# Patient Record
Sex: MALE | Race: OTHER | HISPANIC OR LATINO | Employment: FULL TIME | URBAN - METROPOLITAN AREA
[De-identification: names, ages, dates, MRNs, and addresses within clinical notes are randomized per-mention and may not be internally consistent; named-entity substitution may affect disease eponyms.]

---

## 2022-01-20 ENCOUNTER — OFFICE VISIT (OUTPATIENT)
Dept: URGENT CARE | Facility: CLINIC | Age: 37
End: 2022-01-20

## 2022-01-20 VITALS
HEIGHT: 67 IN | RESPIRATION RATE: 16 BRPM | TEMPERATURE: 97 F | SYSTOLIC BLOOD PRESSURE: 115 MMHG | WEIGHT: 245 LBS | HEART RATE: 62 BPM | OXYGEN SATURATION: 98 % | BODY MASS INDEX: 38.45 KG/M2 | DIASTOLIC BLOOD PRESSURE: 65 MMHG

## 2022-01-20 DIAGNOSIS — R42 DIZZINESS: Primary | ICD-10-CM

## 2022-01-20 DIAGNOSIS — H53.8 BLURRED VISION: ICD-10-CM

## 2022-01-20 DIAGNOSIS — R35.0 FREQUENT URINATION: ICD-10-CM

## 2022-01-20 LAB — GLUCOSE SERPL-MCNC: 84 MG/DL (ref 70–110)

## 2022-01-20 PROCEDURE — 99203 PR OFFICE/OUTPT VISIT, NEW, LEVL III, 30-44 MIN: ICD-10-PCS | Mod: TIER,S$GLB,, | Performed by: NURSE PRACTITIONER

## 2022-01-20 PROCEDURE — 82962 POCT GLUCOSE, HAND-HELD DEVICE: ICD-10-PCS | Mod: TIER,S$GLB,, | Performed by: NURSE PRACTITIONER

## 2022-01-20 PROCEDURE — 82962 GLUCOSE BLOOD TEST: CPT | Mod: TIER,S$GLB,, | Performed by: NURSE PRACTITIONER

## 2022-01-20 PROCEDURE — 99203 OFFICE O/P NEW LOW 30 MIN: CPT | Mod: TIER,S$GLB,, | Performed by: NURSE PRACTITIONER

## 2022-01-20 NOTE — PROGRESS NOTES
"Subjective:       Patient ID: Hugo Baca is a 36 y.o. male.    Vitals:  height is 5' 6.93" (1.7 m) and weight is 111.1 kg (245 lb). His tympanic temperature is 96.6 °F (35.9 °C). His blood pressure is 115/65 and his pulse is 62. His respiration is 16 and oxygen saturation is 98%.     Chief Complaint: Hypertension    Pt presents with c/o an elevated BP reading 4 days ago along with shakiness, blurred vision, and dizziness. Pt states his BP was  225/85 but he was shaky while taking it. Pt continues to have intermittent dizziness and blurred vision as well as urinary frequency. Pt is concerned about his blood pressure and blood sugar. Pt's cousin is translating for him.    Hypertension  This is a new problem. The current episode started in the past 7 days (4). The problem has been gradually worsening since onset. Associated symptoms include blurred vision, malaise/fatigue, neck pain, palpitations and shortness of breath. Pertinent negatives include no anxiety, chest pain, headaches or sweats. Past treatments include nothing.       Neck: Positive for neck pain.   Cardiovascular: Positive for palpitations. Negative for chest pain.   Eyes: Positive for blurred vision.   Respiratory: Positive for shortness of breath.    Neurological: Negative for headaches.       Objective:      Physical Exam   Constitutional: He is oriented to person, place, and time. He appears well-developed and well-nourished. He is cooperative.  Non-toxic appearance. He does not appear ill. No distress.   HENT:   Head: Normocephalic and atraumatic.   Ears:   Right Ear: Hearing, external ear and ear canal normal. A middle ear effusion is present.   Left Ear: Hearing, external ear and ear canal normal. A middle ear effusion is present.   Nose: Nose normal. No mucosal edema, rhinorrhea or nasal deformity. No epistaxis. Right sinus exhibits no maxillary sinus tenderness and no frontal sinus tenderness. Left sinus exhibits no maxillary sinus " tenderness and no frontal sinus tenderness.   Mouth/Throat: Uvula is midline, oropharynx is clear and moist and mucous membranes are normal. No trismus in the jaw. Normal dentition. No uvula swelling. Cobblestoning present. No posterior oropharyngeal erythema. Tonsils are 3+ on the right. Tonsils are 1+ on the left.   Eyes: Conjunctivae and lids are normal. Right eye exhibits no discharge. Left eye exhibits no discharge. No scleral icterus.   Neck: Trachea normal and phonation normal. Neck supple.   Cardiovascular: Normal rate, regular rhythm, normal heart sounds and intact distal pulses.   Pulmonary/Chest: Effort normal and breath sounds normal. No respiratory distress. He has no decreased breath sounds. He has no wheezes.   Abdominal: Normal appearance.   Musculoskeletal: Normal range of motion.         General: No deformity or edema. Normal range of motion.   Neurological: He is alert and oriented to person, place, and time. He exhibits normal muscle tone. Coordination normal.   Skin: Skin is warm, dry, intact, not diaphoretic and not pale.   Psychiatric: He has a normal mood and affect. His speech is normal and behavior is normal. Judgment and thought content normal.   Nursing note and vitals reviewed.        Assessment:       1. Dizziness    2. Blurred vision    3. Frequent urination          Plan:         Dizziness    Blurred vision  -     POCT Glucose, Hand-Held Device    Frequent urination  -     POCT Glucose, Hand-Held Device                 Results for orders placed or performed in visit on 01/20/22   POCT Glucose, Hand-Held Device   Result Value Ref Range    POC Glucose 84 70 - 110 MG/DL     Lab result reviewed and discussed with patient.    Advised pt that he should follow up with primary care. Pt states he doesn't have insurance. States this has happened to him before about one year ago and nothing was found wrong at that time. Information printed for patient on dizziness.    · Get plenty of  rest.  · Increase fluids.   · May apply warm compresses as needed for facial pain and congestion.   · Saline nasal spray to loosen nasal congestion.  · Humidifier or steamy shower may help with congestion.  · Take Guaifenesin or Sudafed to help with congestion.  · Flonase or Nasacort to reduce inflammation in the sinus cavities.  · You may take an over the counter 24 hour antihistamine such as Zyrtec or Claritin for allergy symptoms such as sneezing, itchy/watery eyes, scratchy throat, or congestion.  · Warm salt water gargles, Cepacol throat lozenges, or Chloraseptic spray for sore throat.  · Take Tylenol or Ibuprofen as needed for sore throat, body aches, or fever.  · Take an over the counter cough suppressant such as Delsym or Robitussin DM as directed on label for cough.  · Follow up with your primary care provider if symptoms persist >10 days or sooner for any new or worsening symptoms.   · Go to the ER for any fever that does not improve with Tylenol/Ibuprofen, neck stiffness, rash, severe headache, vision changes, shortness of breath, chest pain, facial swelling, severe facial pain, or any other new and concerning symptoms.

## 2022-01-20 NOTE — PATIENT INSTRUCTIONS
"· Get plenty of rest.  · Increase fluids.   · May apply warm compresses as needed for facial pain and congestion.   · Saline nasal spray to loosen nasal congestion.  · Humidifier or steamy shower may help with congestion.  · Take Guaifenesin or Sudafed to help with congestion.  · Flonase or Nasacort to reduce inflammation in the sinus cavities.  · You may take an over the counter 24 hour antihistamine such as Zyrtec or Claritin for allergy symptoms such as sneezing, itchy/watery eyes, scratchy throat, or congestion.  · Warm salt water gargles, Cepacol throat lozenges, or Chloraseptic spray for sore throat.  · Take Tylenol or Ibuprofen as needed for sore throat, body aches, or fever.  · Take an over the counter cough suppressant such as Delsym or Robitussin DM as directed on label for cough.  · Follow up with your primary care provider if symptoms persist >10 days or sooner for any new or worsening symptoms.   · Go to the ER for any fever that does not improve with Tylenol/Ibuprofen, neck stiffness, rash, severe headache, vision changes, shortness of breath, chest pain, facial swelling, severe facial pain, or any other new and concerning symptoms.       Patient Education       Instrucciones para el bryan médica después de sufrir mareos sin vértigo   Acerca de luis pia   Cada persona siente el mareo de diferente manera. Por ejemplo, puede sentirse mareado o ginger si estuviera a punto de desmayarse. Puede tener problemas para caminar derecho y sentirse que se está por caer cuando está mareado. Sin embargo, algunas personas sienten ginger si estuvieran girando o ginger si el guanakito a weaver alrededor estuviera dando vueltas. Esta sensación de nellie vueltas se conoce ginger "vértigo".  Muchos factores pueden causarle mareos. Algunos son graves, ginger problemas del corazón o un accidente cerebrovascular. Factores menos graves, ginger levantarse demasiado rápido o no beber suficientes líquidos, también pueden causarle mareos. Algunos " medicamentos también pueden provocarle mareos.  Puede sentirse mareado por un período breve y luego recuperarse. También puede durar un tiempo prolongado y afectar carlos actividades diarias. El tratamiento para el vértigo dependerá de la causa.     ¿Qué cuidados se necesitan en casa?   · Pregúntele a weaver médico qué debe hacer cuando regrese a casa. Asegúrese de hacer preguntas si no comprende lo que dice el médico.  · Evite cambiar de posición demasiado rápido. Tenga cuidado cuando se incorpore luego de estar o sentado.  · Siéntese en el borde de la cama unos minutos antes de ponerse de pie. Empiece a caminar lentamente después de levantarse.  · Si necesita estar sentado o parado en mitchell posición matthew un tiempo prolongado, mueva las piernas con frecuencia.  · Asegúrese de beber suficientes líquidos, aunque no tenga sed.  · Si se siente mareado o que se va a desmayar, siéntese o recuéstese de inmediato. Tenga cuidado especial para protegerse de las caídas. Evite conducir si está mareado. Si se siente mareado al conducir, deténgase de inmediato.  · Si debe caminar a algún lugar cuando se sienta mareado, es posible que deba usar un bastón o un andador, o que otra persona lo ayude para que no se caiga.  ¿Qué cuidados se necesitan en la etapa de seguimiento?   · Es posible que los médicos le soliciten que visite el consultorio para evaluar weaver progreso. No falte a estas citas.  · Weaver médico puede enviarlo a un médico que se especialice en el cerebro, los nervios o el corazón para determinar la causa de weaver mareo.  ¿Qué medicamentos pueden ser necesarios?   Es posible que el médico le recete medicamentos para lo siguiente:  · Tratar la afección que causa el vértigo.  · Disminuir la sensación de mareo  ¿Estará restringida la actividad física?   · Evite actividades que puedan ocasionar episodios de vértigo.  · No conduzca si se siente mareado.  · No use caminadoras si se siente mareado.  ¿Qué problemas podrían surgir?    · Caerse y lastimarse.  · Participar en un accidente de tránsito.  ¿Cómo puede prevenirse luis problema de abebe?   · Tali mucho líquido todos los días.  · Permita que el médico revise carlos medicamentos para intentar reducir aquellos que causan mareos.  · Use un monitor de presión arterial.  · Evite beber alcohol o usar otros medicamentos  ¿Cuándo chente llamar al médico?   · Tiene mitchell nueva sensación de debilidad en los brazos o las piernas.  · Desarrolla nuevos problemas para hablar, tragar, corey u oír.  · Tiene dolor en el pecho, latidos irregulares o problemas para respirar.  · Sufre mitchell convulsión.  · No puede caminar o pararse debido a los mareos.  · Weaver mareo continúa matthew mucho tiempo o empeora.  Consejos útiles   Si tiene episodios de vértigo:  · Siéntese en mitchell silla y coloque la stacey entre las piernas.  · Respire profunda y lentamente.  · Levántese lentamente.  · Tecolotito aire fresco.  Repita la enseñanza con carlos propias palabras (Teach Back): Ayudándolo a comprender   El método de enseñanza recíproca ayuda a comprender la información que se le está proporcionando. Después de hablar con el personal, cuente con carlos propias palabras lo que acaba de aprender. [place] le asegura que el personal ha descrito todos los aspectos necesarios de forma dior. También ayuda a explicar ciertas cosas que pueden little sido confusas. Antes de irse a weaver casa, asegúrese de poder hacer lo siguiente:  · Hablar sobre mi afección.  · Decir qué haré para mantenerme a daljit cuando me desplace.  · Decir qué haré en damon de tener problemas para hablar o moverme.  ¿Dónde puedo obtener más información?   American Academy of Otolaryngology-Head and Neck [employer]  http://www.entnet.org/HealthInformation/dizzinessMotionSickness.cfm   Min[employer] of Health  http://www.health[place]/your-health/conditions-and-treatments/diseases-and-illnesses/dizziness   [facility] Choices  http://www.nhs.uk/conditions/dizziness/pages/introduction.aspx   Exención de  responsabilidad y uso de la información del consumidor   Esta información no constituye asesoramiento médico específico y no reemplaza la información que usted recibe de weaver proveedor de atención médica. Aniya es tan solo un resumen de la información general. NO incluye la información completa acerca de afecciones, enfermedades, lesiones, pruebas, procedimientos, tratamientos, terapias, instrucciones para el bryan o estilos de juanita que apliquen en weaver damon. Debe hablar con el proveedor de atención médica para obtener información completa sobre weaver abebe y las opciones de tratamiento. No se debe utilizar esta información para decidir si acepta o no el consejo, instrucciones o recomendaciones del proveedor de atención médica. Solamente el proveedor de atención médica cuenta con los conocimientos y la capacitación para brindarle el mejor consejo.  Copyright   Copyright © 2021 [employer] y carlos licenciantes y/o afiliados. Todos los derechos reservados.